# Patient Record
Sex: FEMALE | Race: AMERICAN INDIAN OR ALASKA NATIVE
[De-identification: names, ages, dates, MRNs, and addresses within clinical notes are randomized per-mention and may not be internally consistent; named-entity substitution may affect disease eponyms.]

---

## 2017-09-03 ENCOUNTER — HOSPITAL ENCOUNTER (EMERGENCY)
Dept: HOSPITAL 5 - ED | Age: 37
Discharge: HOME | End: 2017-09-03
Payer: COMMERCIAL

## 2017-09-03 VITALS — SYSTOLIC BLOOD PRESSURE: 146 MMHG | DIASTOLIC BLOOD PRESSURE: 99 MMHG

## 2017-09-03 DIAGNOSIS — N93.9: Primary | ICD-10-CM

## 2017-09-03 DIAGNOSIS — I10: ICD-10-CM

## 2017-09-03 DIAGNOSIS — R10.2: ICD-10-CM

## 2017-09-03 LAB
BASOPHILS NFR BLD AUTO: 0.7 % (ref 0–1.8)
BILIRUB UR QL STRIP: (no result)
BLOOD UR QL VISUAL: (no result)
EOSINOPHIL NFR BLD AUTO: 1.4 % (ref 0–4.3)
HCT VFR BLD CALC: 29.9 % (ref 30.3–42.9)
HGB BLD-MCNC: 8.8 GM/DL (ref 10.1–14.3)
KETONES UR STRIP-MCNC: (no result) MG/DL
LEUKOCYTE ESTERASE UR QL STRIP: (no result)
MCH RBC QN AUTO: 19 PG (ref 28–32)
MCHC RBC AUTO-ENTMCNC: 30 % (ref 30–34)
MCV RBC AUTO: 63 FL (ref 79–97)
MUCOUS THREADS #/AREA URNS HPF: (no result) /HPF
NITRITE UR QL STRIP: (no result)
PH UR STRIP: 6 [PH] (ref 5–7)
PLATELET # BLD: 234 K/MM3 (ref 140–440)
RBC # BLD AUTO: 4.77 M/MM3 (ref 3.65–5.03)
RBC #/AREA URNS HPF: > 182 /HPF (ref 0–6)
UROBILINOGEN UR-MCNC: < 2 MG/DL (ref ?–2)
WBC # BLD AUTO: 8.7 K/MM3 (ref 4.5–11)
WBC #/AREA URNS HPF: 7 /HPF (ref 0–6)

## 2017-09-03 PROCEDURE — 96372 THER/PROPH/DIAG INJ SC/IM: CPT

## 2017-09-03 PROCEDURE — 81001 URINALYSIS AUTO W/SCOPE: CPT

## 2017-09-03 PROCEDURE — 99284 EMERGENCY DEPT VISIT MOD MDM: CPT

## 2017-09-03 PROCEDURE — 36415 COLL VENOUS BLD VENIPUNCTURE: CPT

## 2017-09-03 PROCEDURE — 85025 COMPLETE CBC W/AUTO DIFF WBC: CPT

## 2017-09-03 PROCEDURE — 84703 CHORIONIC GONADOTROPIN ASSAY: CPT

## 2017-09-03 NOTE — EMERGENCY DEPARTMENT REPORT
ED General Adult HPI





- General


Chief complaint: Vaginal Bleeding


Stated complaint: VAGINAL BLEEDING


Time Seen by Provider: 17 20:00


Source: patient


Mode of arrival: Ambulatory


Limitations: No Limitations





- History of Present Illness


Initial comments: 





Patient is a 37-year-old female past medical history of dysmenorrhea who 

presents with vaginal bleeding that's been going on for the last year.  She 

states the last 3 days she has had increase in vaginal bleeding she states that 

she has used a pad today.  She feels weak. Patient has no problem urinating and 

states that her pelvic pain is an 8 out of 10 as a crampy achy type of pain 

nothing makes it better or worse.  Patient's pain radiates to her thighs.


Severity scale (0 -10): 0





- Related Data


 Home Medications











 Medication  Instructions  Recorded  Confirmed  Last Taken


 


Losartan-Hctz 50-12.5 mg Tab 1 tab PO DAILY 16


 


amLODIPine 10 mg PO HS 16








 Previous Rx's











 Medication  Instructions  Recorded  Last Taken  Type


 


Naproxen [Naprosyn] 375 mg PO BID #20 tablet 17 Unknown Rx











 Allergies











Allergy/AdvReac Type Severity Reaction Status Date / Time


 


No Known Allergies Allergy   Verified 17 19:50














ED Review of Systems


ROS: 


Stated complaint: VAGINAL BLEEDING


Other details as noted in HPI





Constitutional: denies: chills, fever


Eyes: denies: eye pain, eye discharge, vision change


ENT: denies: ear pain, throat pain


Respiratory: denies: cough, shortness of breath, wheezing


Cardiovascular: denies: chest pain, palpitations


Endocrine: no symptoms reported


Gastrointestinal: denies: abdominal pain, nausea, diarrhea


Genitourinary: abnormal menses, other (vaginal bleeding and pelvic crampin ).  

denies: urgency, dysuria, discharge


Musculoskeletal: denies: back pain, joint swelling, arthralgia


Skin: denies: rash, lesions


Neurological: denies: headache, weakness, paresthesias


Psychiatric: denies: anxiety, depression


Hematological/Lymphatic: denies: easy bleeding, easy bruising





ED Past Medical Hx





- Past Medical History


Hx Hypertension: Yes


Additional medical history: FIBROIDS





- Surgical History


Hx Coronary Stent: No


Hx Open Heart Surgery: No


Hx Pacemaker: No


Hx Internal Defibrillator: No


Hx Cholecystectomy: No


Hx Appendectomy: No


Hx Breast Surgery: No


Additional Surgical History: FIBROIDS REMOVED.  





- Social History


Smoking Status: Never Smoker


Substance Use Type: None





- Medications


Home Medications: 


 Home Medications











 Medication  Instructions  Recorded  Confirmed  Last Taken  Type


 


Losartan-Hctz 50-12.5 mg Tab 1 tab PO DAILY 16 History


 


amLODIPine 10 mg PO HS 16 History


 


Naproxen [Naprosyn] 375 mg PO BID #20 tablet 17  Unknown Rx














ED Physical Exam





- General


Limitations: No Limitations


General appearance: alert, in no apparent distress





- Head


Head exam: Present: atraumatic, normocephalic





- Eye


Eye exam: Present: normal appearance





- ENT


ENT exam: Present: mucous membranes moist





- Neck


Neck exam: Present: normal inspection





- Respiratory


Respiratory exam: Present: normal lung sounds bilaterally.  Absent: respiratory 

distress





- Cardiovascular


Cardiovascular Exam: Present: regular rate, normal rhythm.  Absent: systolic 

murmur, diastolic murmur, rubs, gallop





- GI/Abdominal


GI/Abdominal exam: Present: soft, normal bowel sounds





- 


Speculum exam: Present: vaginal bleeding (cannot visualize cervical os due to 

pooling of blood no active bleeding.)





- Extremities Exam


Extremities exam: Present: normal inspection





- Back Exam


Back exam: Present: normal inspection





- Neurological Exam


Neurological exam: Present: alert, oriented X3





- Psychiatric


Psychiatric exam: Present: normal affect, normal mood





- Skin


Skin exam: Present: warm, dry, intact, normal color.  Absent: rash





ED Course


 Vital Signs











  17





  12:59 18:07 19:50


 


Temperature 98.2 F 98.7 F 


 


Pulse Rate 96 H 77 74


 


Respiratory 18 18 20





Rate   


 


Blood Pressure 135/86  


 


Blood Pressure  139/91 144/93





[Left]   


 


O2 Sat by Pulse 100 100 100





Oximetry   














  17





  21:00


 


Temperature 


 


Pulse Rate 73


 


Respiratory 20





Rate 


 


Blood Pressure 


 


Blood Pressure 146/99





[Left] 


 


O2 Sat by Pulse 100





Oximetry 














- Reevaluation(s)


Reevaluation #1: 





17 22:32


Patient is feeling better after IM Toradol shot patient will be sent home.





ED Medical Decision Making





- Lab Data


Result diagrams: 


 17 13:05











 Lab Results











  17 Range/Units





  13:05 13:05 14:54 


 


WBC  8.7    (4.5-11.0)  K/mm3


 


RBC  4.77    (3.65-5.03)  M/mm3


 


Hgb  8.8 L    (10.1-14.3)  gm/dl


 


Hct  29.9 L    (30.3-42.9)  %


 


MCV  63 L    (79-97)  fl


 


MCH  19 L    (28-32)  pg


 


MCHC  30    (30-34)  %


 


RDW  20.3 H    (13.2-15.2)  %


 


Plt Count  234    (140-440)  K/mm3


 


Lymph % (Auto)  19.7    (13.4-35.0)  %


 


Mono % (Auto)  7.2    (0.0-7.3)  %


 


Eos % (Auto)  1.4    (0.0-4.3)  %


 


Baso % (Auto)  0.7    (0.0-1.8)  %


 


Lymph #  1.7    (1.2-5.4)  K/mm3


 


Mono #  0.6    (0.0-0.8)  K/mm3


 


Eos #  0.1    (0.0-0.4)  K/mm3


 


Baso #  0.1    (0.0-0.1)  K/mm3


 


Seg Neutrophils %  71.0 H    (40.0-70.0)  %


 


Seg Neutrophils #  6.2    (1.8-7.7)  K/mm3


 


HCG, Qual   Negative   (Negative)  


 


Urine Color    Red  (Yellow)  


 


Urine Turbidity    Clear  (Clear)  


 


Urine pH    6.0  (5.0-7.0)  


 


Ur Specific Gravity    1.011  (1.003-1.030)  


 


Urine Protein    30 mg/dl  (Negative)  mg/dL


 


Urine Glucose (UA)    Neg  (Negative)  mg/dL


 


Urine Ketones    Neg  (Negative)  mg/dL


 


Urine Blood    Lg  (Negative)  


 


Urine Nitrite    Neg  (Negative)  


 


Urine Bilirubin    Neg  (Negative)  


 


Urine Urobilinogen    < 2.0  (<2.0)  mg/dL


 


Ur Leukocyte Esterase    Tr  (Negative)  


 


Urine WBC (Auto)    7.0 H  (0.0-6.0)  /HPF


 


Urine RBC (Auto)    > 182.0  (0.0-6.0)  /HPF


 


Urine Mucus    Few  /HPF














- Medical Decision Making





Chief medical diagnosis: Dysfunctional uterine bleeding


Differential medical diagnosis: Uterine fibroids, urinary tract infection





A CBC, CMP, urinalysis, pelvic exam and I will give IM Toradol.





Patient is feeling better after IM Toradol I will send patient home.  And I 

will have patient follow up with Dr. Martinez [facility] for her dysfunctional 

uterine bleeding.  Patient agrees with plan and additional verbal discharge 

instructions were given.


Critical care attestation.: 


If time is entered above; I have spent that time in minutes in the direct care 

of this critically ill patient, excluding procedure time.








ED Disposition


Clinical Impression: 


 Vaginal bleeding, Pelvic cramping





Anemia


Qualifiers:


 Anemia type: unspecified type Qualified Code(s): D64.9 - Anemia, unspecified





Disposition:  TO HOME OR SELFCARE


Is pt being admited?: No


Does the pt Need Aspirin: No


Condition: Stable


Instructions:  Dysfunctional Uterine Bleeding (ED), Menorrhagia (ED)


Prescriptions: 


Naproxen [Naprosyn] 375 mg PO BID #20 tablet


Referrals: 


HI MARTINEZ MD [Staff Physician] - 3-5 Days

## 2018-02-27 ENCOUNTER — HOSPITAL ENCOUNTER (EMERGENCY)
Dept: HOSPITAL 5 - ED | Age: 38
Discharge: HOME | End: 2018-02-27
Payer: COMMERCIAL

## 2018-02-27 VITALS — DIASTOLIC BLOOD PRESSURE: 76 MMHG | SYSTOLIC BLOOD PRESSURE: 121 MMHG

## 2018-02-27 DIAGNOSIS — D25.9: Primary | ICD-10-CM

## 2018-02-27 DIAGNOSIS — D50.0: ICD-10-CM

## 2018-02-27 DIAGNOSIS — I10: ICD-10-CM

## 2018-02-27 DIAGNOSIS — N93.9: ICD-10-CM

## 2018-02-27 LAB
BASOPHILS # (AUTO): 0.1 K/MM3 (ref 0–0.1)
BASOPHILS NFR BLD AUTO: 0.6 % (ref 0–1.8)
BILIRUB UR QL STRIP: (no result)
BLOOD UR QL VISUAL: (no result)
BUN SERPL-MCNC: 16 MG/DL (ref 7–17)
BUN/CREAT SERPL: 27 %
CALCIUM SERPL-MCNC: 8.5 MG/DL (ref 8.4–10.2)
EOSINOPHIL # BLD AUTO: 0.2 K/MM3 (ref 0–0.4)
EOSINOPHIL NFR BLD AUTO: 1.7 % (ref 0–4.3)
HCT VFR BLD CALC: 25 % (ref 30.3–42.9)
HEMOLYSIS INDEX: 0
HGB BLD-MCNC: 7.3 GM/DL (ref 10.1–14.3)
LYMPHOCYTES # BLD AUTO: 2.2 K/MM3 (ref 1.2–5.4)
LYMPHOCYTES NFR BLD AUTO: 24.4 % (ref 13.4–35)
MCH RBC QN AUTO: 16 PG (ref 28–32)
MCHC RBC AUTO-ENTMCNC: 29 % (ref 30–34)
MCV RBC AUTO: 56 FL (ref 79–97)
MONOCYTES # (AUTO): 0.6 K/MM3 (ref 0–0.8)
MONOCYTES % (AUTO): 6.9 % (ref 0–7.3)
MUCOUS THREADS #/AREA URNS HPF: (no result) /HPF
PH UR STRIP: 5 [PH] (ref 5–7)
PLATELET # BLD: 314 K/MM3 (ref 140–440)
RBC # BLD AUTO: 4.5 M/MM3 (ref 3.65–5.03)
RBC #/AREA URNS HPF: > 182 /HPF (ref 0–6)
UROBILINOGEN UR-MCNC: < 2 MG/DL (ref ?–2)
WBC #/AREA URNS HPF: 34 /HPF (ref 0–6)

## 2018-02-27 PROCEDURE — 86901 BLOOD TYPING SEROLOGIC RH(D): CPT

## 2018-02-27 PROCEDURE — 86900 BLOOD TYPING SEROLOGIC ABO: CPT

## 2018-02-27 PROCEDURE — 85025 COMPLETE CBC W/AUTO DIFF WBC: CPT

## 2018-02-27 PROCEDURE — 84703 CHORIONIC GONADOTROPIN ASSAY: CPT

## 2018-02-27 PROCEDURE — 99284 EMERGENCY DEPT VISIT MOD MDM: CPT

## 2018-02-27 PROCEDURE — 86850 RBC ANTIBODY SCREEN: CPT

## 2018-02-27 PROCEDURE — 81001 URINALYSIS AUTO W/SCOPE: CPT

## 2018-02-27 PROCEDURE — 80048 BASIC METABOLIC PNL TOTAL CA: CPT

## 2018-02-27 PROCEDURE — 36415 COLL VENOUS BLD VENIPUNCTURE: CPT

## 2018-02-27 NOTE — EMERGENCY DEPARTMENT REPORT
HPI





- General


Chief Complaint: Vaginal Bleeding


Time Seen by Provider: 18 16:17





- HPI


HPI: 








The patient is a 38-year-old female who presents for evaluation of abdominal 

pain, vaginal bleeding and lightheadedness for the past 3 months.  She reports 

mild to moderate cramping lower abdominal pain for the past 1 week, worsened 

with movement, improved with heating pad, and associated with mild 

lightheadedness for the 1-2 days, exacerbated with position changes, improved 

with rest and lying flat, and associated with constant mild intermittent 

vaginal bleeding.  She has a history of fibroids and recurrent vaginal 

bleeding. The patient denies fever, chills, night sweats, vomiting, hematemesis

, chest pain, dyspnea, hemoptysis, diarrhea, blood in the stool, dark tarry 

stool, dysuria, hematuria, flank pain, genital discharge, inability to pass 

flatus. 








ED Past Medical Hx





- Past Medical History


Hx Hypertension: Yes


Additional medical history: FIBROIDS





- Surgical History


Hx Coronary Stent: No


Hx Open Heart Surgery: No


Hx Pacemaker: No


Hx Internal Defibrillator: No


Hx Cholecystectomy: No


Hx Appendectomy: No


Hx Breast Surgery: No


Additional Surgical History: FIBROIDS REMOVED.  





- Social History


Smoking Status: Never Smoker


Substance Use Type: None





- Medications


Home Medications: 


 Home Medications











 Medication  Instructions  Recorded  Confirmed  Last Taken  Type


 


Losartan-Hctz 50-12.5 mg Tab 1 tab PO DAILY 16 History


 


amLODIPine 10 mg PO HS 16 History


 


Acetaminophen/Codeine [Tylenol #3] 1 tab PO Q6H PRN #14 tab 18  Unknown Rx


 


Ondansetron [Zofran TAB] 4 mg PO Q8HR PRN #15 tablet 18  Unknown Rx


 


Pnv No.95/Ferrous Fum/Folic AC 1 each PO QDAY #31 tablet 18  Unknown Rx





[Prenatal Vitamin Tablet]     














ED Review of Systems


ROS: 


Stated complaint: VAGINAL BLEEDING


Other details as noted in HPI








Constitutional: reports lightheadedness denies: fever


ENT: denies: throat or neck pain


Respiratory: denies: cough, shortness of breath


Cardiovascular: denies: chest pain


Endocrine: denies unexplained weight loss or gain


Gastrointestinal: denies: abdominal pain, nausea


Genitourinary: reports vaginal bleeding denies: dysuria


Musculoskeletal: denies: leg swelling


Skin: denies: rash


Neurological: denies: headache


Hematological/Lymphatic: denies: easy bleeding or easy bruising  


Psych: denies sadness or hopelessness











Physical Exam





- Physical Exam


Vital Signs: 


 Vital Signs











  18





  02:35


 


Temperature 98.5 F


 


Pulse Rate 82


 


Respiratory 18





Rate 


 


Blood Pressure 121/76


 


O2 Sat by Pulse 98





Oximetry 











Physical Exam: 








General: well-nourished, well-developed, no acute distress


Head: Normocephalic, atraumatic


Eyes: normal sclera


ENT: Mucous membranes are pale and dry


Neck: No neck stiffness, no cervical adenopathy


Respiratory: Breath sounds equal bilaterally, no wheezing, rales, or rhonchi


Cardio: S1 and S2 present, no murmurs, rubs, gallops, capillary refill is 

delayed


Abdomen: Normoactive bowel sounds, soft abdomen, no rigidity, no guarding or 

rebound tenderness


Musc: No pitting edema


Skin: No rash


Neuro: no facial drooping, normal speech


Psych: Normal affect








ED Course


 Vital Signs











  18





  02:35


 


Temperature 98.5 F


 


Pulse Rate 82


 


Respiratory 18





Rate 


 


Blood Pressure 121/76


 


O2 Sat by Pulse 98





Oximetry 














ED Medical Decision Making





- Lab Data


Result diagrams: 


 18 03:20





 18 03:20





- Medical Decision Making








The patient was seen and examined by myself.  The patient is placed on a 

cardiac monitor and continuous pulse ox. On initial evaluation, the patient was 

found to be in no distress. Evaluation orders were placed.  The patient is 

given pain medicine.  Lab results reveal low hemoglobin, consistent with known 

history of chronic anemia secondary to fibroids and chronic vaginal bleeding.  

Otherwise labs were not concerning including normal platelet level. The patient 

was reevaluated and reported that their symptoms were markedly improved.  The 

patient is stable for discharge with outpatient follow-up.  The patient is 

given follow-up and return instructions.  The patient expressed understanding 

and agreed with the plan.  The patient is discharged in stable condition.


Critical care attestation.: 


If time is entered above; I have spent that time in minutes in the direct care 

of this critically ill patient, excluding procedure time.








ED Disposition


Clinical Impression: 


 Abdominal pain, acute, periumbilical, Suprapubic abdominal pain, Vaginal 

bleeding, Anemia due to chronic blood loss





Fibroid, uterine


Qualifiers:


 Uterine leiomyoma location: unspecified location Qualified Code(s): D25.9 - 

Leiomyoma of uterus, unspecified





Disposition:  TO HOME OR SELFCARE


Is pt being admited?: No


Does the pt Need Aspirin: No


Condition: Stable


Instructions:  Dysmenorrhea (ED), Uterine Fibroids (ED), Acute Abdominal Pain (

ED)


Prescriptions: 


Acetaminophen/Codeine [Tylenol #3] 1 tab PO Q6H PRN #14 tab


 PRN Reason: Pain


Ondansetron [Zofran TAB] 4 mg PO Q8HR PRN #15 tablet


 PRN Reason: Nausea


Pnv No.95/Ferrous Fum/Folic AC [Prenatal Vitamin Tablet] 1 each PO QDAY #31 

tablet


Referrals: 


KAT WALKER MD [Staff Physician] - 3-5 Days


Time of Disposition: 17:11